# Patient Record
Sex: FEMALE | URBAN - METROPOLITAN AREA
[De-identification: names, ages, dates, MRNs, and addresses within clinical notes are randomized per-mention and may not be internally consistent; named-entity substitution may affect disease eponyms.]

---

## 2024-03-28 ENCOUNTER — OV NP (OUTPATIENT)
Dept: URBAN - METROPOLITAN AREA CLINIC 63 | Facility: CLINIC | Age: 34
End: 2024-03-28

## 2024-03-28 NOTE — HPI-TODAY'S VISIT:
Ashley is a pleasant 33-year-old female who presents today for evaluation of anemia   EGD dated 8/11/2022 with [facility] showed a normal EGD.  No significant pathologic abnormality.  No histologic evidence of gluten sensitive enteropathy.  Active chronic gastritis negative for H. pylori.   Colonoscopy dated 8/11/2022 with [facility] showed a 1 cm pedunculated rectosigmoid colon tubulovillous adenoma   CT abdomen/pelvis without contrast dated 8/9/2022 showed heterogeneous decreased hepatic attenuation.  Decreased hepatic attenuation may be related to underlying edema/hepatitis or hepatic steatosis.  Correlation with lab values and physical exam is recommended.  No evidence of appendicitis Ultrasound dated 3/2/2017 showed cholelithiasis.  Gallbladder walls are thickened at 0.4 cm.  Fatty infiltration of the liver.  Labs from 1/17 ........